# Patient Record
Sex: FEMALE | Race: WHITE | Employment: FULL TIME
[De-identification: names, ages, dates, MRNs, and addresses within clinical notes are randomized per-mention and may not be internally consistent; named-entity substitution may affect disease eponyms.]

---

## 2017-07-08 ENCOUNTER — HEALTH MAINTENANCE LETTER (OUTPATIENT)
Age: 67
End: 2017-07-08

## 2018-01-18 ENCOUNTER — TELEPHONE (OUTPATIENT)
Dept: FAMILY MEDICINE | Facility: CLINIC | Age: 68
End: 2018-01-18

## 2018-01-18 DIAGNOSIS — Z20.828 EXPOSURE TO INFLUENZA: Primary | ICD-10-CM

## 2018-01-18 RX ORDER — OSELTAMIVIR PHOSPHATE 75 MG/1
75 CAPSULE ORAL 2 TIMES DAILY
Qty: 10 CAPSULE | Refills: 0 | Status: SHIPPED | OUTPATIENT
Start: 2018-01-18 | End: 2018-09-04

## 2018-01-18 NOTE — TELEPHONE ENCOUNTER
Reason for Call:  Other call back    Detailed comments: Pt is in [place] and son has been diagnosed with flu. She is requesting Tamiflu be called in to [facility] in [place]. She will get the pharmacy information to give to nurse when she calls. Pt does have symptoms for flu also.    Phone Number Patient can be reached at: Cell number on file:    Telephone Information:   Mobile [phone]       Best Time: any        Call taken on 1/18/2018 at 10:12 AM by Falguni Carranza

## 2018-01-18 NOTE — TELEPHONE ENCOUNTER
"Sent in to the pharmacy (I called them and got the correct number, ) and notified Nidia that this has been done. Advised if worse to be seen. \"ok, thank you\"    Simran MARTINEZ    "

## 2018-01-18 NOTE — TELEPHONE ENCOUNTER
"Influenza-Like Illness (AURELIO) Protocol    Nancy K Eisenmenger      Age: 67 year old     YOB: 1950    Are you currently sick or have you had close contact with someone who is currently sick?   Yes, this patient is currently sick.     Adult Clinical Evaluation    Is this patient experiencing ANY of the following?  Unconsciousness or unresponsiveness No   Difficulty breathing or swallowing No   Blue or dusky lips, skin, or nail beds No   Chest pain No   Severe confusion or delirium No   Seizure activity: ongoing or stopped No   Severe dehydration or signs of shock No   Patient sounds very sick on the phone No     Is this patient experiencing ANY of the following?  Fever > 104 or shaking chills Out of town, no thermometer. No shaking chills, \"just hot and cold at times\"    Wheezing with minimal response to usual wheezing medications or new wheezing No   Repeated vomiting or diarrhea with signs of dehydration (no urination within last 12 hours) Vomited once last night, urinated this morning   Flu-like symptoms that initially improved but returned with fever and a worse cough No   Stiff or painful neck Sore neck, not stiff, and able to touch her chin to her chest.    Severe headache Not worst headache of her life. /\"just a headache\"      Does the patient have any of the following?  Measured fever > 100 degrees As above, out of town and no thermometer   Chills or feels very warm to the touch Feel hot and cold at times    Cough Yes, non productive    Sore throat \"a touch of sore throat\"    Muscle/ body aches Yes   Headaches Yes   Fatigue (tiredness) Yes     Nursing Plan      Below are conditions which place adults at increased risk for the more severe complications of influenza.    Does this patient have ANY of the following conditions?  Chronic pulmonary disease such as asthma or COPD No   Heart disease (CHF, CAD, anticoag due to arrhythmia) No   Liver disease (hepatitis, liver failure, cirrhosis) No "   Kidney disease (renal failure, insufficiency or dialysis) No   Metabolic disorder (e.g. diabetes) No   Neuromuscular disorder (EFRAÍN KIRKPATRICK MD, myasthenia gravis) No   Compromised ability to handle respiratory secretions No   Hematologic disorder (e.g. sickle cell disease) No   HIV / AIDS No   Chemotherapy or radiation within the last 3 months No   Received an organ or a bone marrow transplant No   Taking Prednisone in excess of 20mg daily No   Is age 65 years or older Yes   Is pregnant, thinks she may be pregnant or is within two weeks after delivery No   Is a resident of a chronic care facility No   Is patient  or [place] native  No     Patient falls into high risk category.   AURELIO symptom onset less than 48 hours.    No Known Allergies    Does this patient have an allergy or hypersensitivity to Oseltamivir (Tamiflu)?  No.      Patient Active Problem List   Diagnosis     CARDIOVASCULAR SCREENING; LDL GOAL LESS THAN 160     Kyphosis     Osteoporosis     Heart murmur       Last recorded GFR on this patient:   No results found for: GFRESTIMATED  No results found for: GFRESTBLACK    Sex:  female     Wt Readings from Last 1 Encounters:   05/31/13 148 lb (67.1 kg)       Age:  67 year old     No results found for: CR  Creatinine Clearance Calculator    Does this patient currently have kidney disease? (defined as Chronic Kidney Disease Stage 3,4 or 5 on the problem list or a GFR less than 60 ml/min if known)  No. No creatinine on file.  Huddled with provider       If further questions/concerns or if new symptoms develop, call your PCP or [facility] as soon as possible.      Provided home care instructions    General home care instruction:      Avoid contact with people in your household who are at increased risk for more severe complications of influenza (such as pregnant women or people who have a chronic health condition, for example diabetes, heart disease, asthma, or emphysema).    Stay home  from work, school, childcare or other public places until your fever (37.8 degrees Celsius [100 degrees Fahrenheit]) has been gone for at least 24 hours, except to seek medical care. (Fever should be gone without the use of fever-reducing medications.) Use a surgical mask if available, or cover your mouth and nose with a tissue if possible if you need to seek medical care. Contact your work place, school, or  as they may have longer exclusion times.    You may continue to shed virus after your fever is gone. Limit your contact with high-risk individuals for 10 days after your symptoms started and be especially careful to cover your coughs/sneezes and wash your hands.    Cover your cough and wash your hands often, and especially after coughing, sneezing, blowing your nose.    Drink plenty of fluids (such as water, broth, sports drinks, electrolyte beverages for children) to prevent dehydration.    Avoid tobacco and second hand smoke.    Get plenty of rest.    Use over-the-counter pain relievers as needed per  instructions.    Do not give aspirin (acetylsalicylic acid) or products that contain aspirin (e.g. bismuth subsalicylate - Pepto Bismol) to children or teenagers 18 years or younger.    A small number of people with influenza do not have fever. If you have respiratory symptoms and are at increased risk for complications of influenza, contact your health care provider to discuss these symptoms.    For parents of infants:    If possible, only family members who are not sick should care for infants.    Wash your hands with soap and water, or an alcohol-based hand rub (if your hands are not visibly soiled) before caring for your infant.    Cover your mouth and nose with a tissue when coughing or sneezing, and clean your hands.    Contact a health care provider to discuss your illness within 1-2 days if you are    Pregnant    Immunocompromised    Call 911 if you experience:    Difficulty breathing  or shortness of breath    Pain or pressure in the chest    Confusion or less responsive than normal    Seizure activity: ongoing or stopped    Severe dehydration or signs of shock     Blue or dusky lips, skin, or nail beds    If further questions/concerns or if new symptoms develop, call your PCP or [facility] as soon as possible.    When to seek medical attention    Contact your health care provider right away if you experience:    A painful sore throat accompanied by fever persists for more than 48 hours    Ear pain, sinus pain, persistent vomiting and/or diarrhea    Oral temperature greater than 104  Fahrenheit (40  Celsius)    Dehydration (e.g., mouth feeling dry, dizzy when sitting/standing, decreased urine output)    Severe or persistent vomiting; unable to keep fluids down    Improvement in flu-like symptoms (fever and cough or sore throat) but then return of fever and worse cough or sore throat    Not drinking enough fluid  Any other concerns not stated above      Additional educational resources include:    http://www.mInfo.com    http://www.cdc.gov/flu/      [place]   [address] [facility]   Phone [phone]       Son was in to an ER there and confirmed influenza

## 2018-01-18 NOTE — TELEPHONE ENCOUNTER
Dr Diaz,    We have no creatinine on file, so need you to ok tamiflu if indicated. Please see notes below. (no known kidney disease or problems per her report. )       She is in [place] at father in law's , and her son has influenza, confirmed.   She is 67 (the only high risk positive question)         Simran Kan RNC

## 2018-09-04 ENCOUNTER — HOSPITAL ENCOUNTER (EMERGENCY)
Facility: CLINIC | Age: 68
Discharge: HOME | End: 2018-09-04
Attending: PHYSICIAN ASSISTANT | Admitting: PHYSICIAN ASSISTANT
Payer: MEDICARE

## 2018-09-04 VITALS
TEMPERATURE: 98.2 F | WEIGHT: 145 LBS | OXYGEN SATURATION: 98 % | BODY MASS INDEX: 22.76 KG/M2 | HEIGHT: 67 IN | DIASTOLIC BLOOD PRESSURE: 70 MMHG | RESPIRATION RATE: 16 BRPM | SYSTOLIC BLOOD PRESSURE: 127 MMHG

## 2018-09-04 DIAGNOSIS — J06.9 VIRAL URI WITH COUGH: ICD-10-CM

## 2018-09-04 DIAGNOSIS — H72.91 EAR DRUM PERFORATION, RIGHT: ICD-10-CM

## 2018-09-04 PROCEDURE — G0463 HOSPITAL OUTPT CLINIC VISIT: HCPCS | Performed by: PHYSICIAN ASSISTANT

## 2018-09-04 PROCEDURE — G0463 HOSPITAL OUTPT CLINIC VISIT: HCPCS

## 2018-09-04 PROCEDURE — 99213 OFFICE O/P EST LOW 20 MIN: CPT | Mod: Z6 | Performed by: PHYSICIAN ASSISTANT

## 2018-09-04 RX ORDER — AMOXICILLIN 875 MG
875 TABLET ORAL 2 TIMES DAILY
Qty: 20 TABLET | Refills: 0 | Status: SHIPPED | OUTPATIENT
Start: 2018-09-04 | End: 2019-03-11

## 2018-09-04 RX ORDER — FLUTICASONE PROPIONATE 50 MCG
1-2 SPRAY, SUSPENSION (ML) NASAL DAILY
Qty: 1 BOTTLE | Refills: 0 | Status: SHIPPED | OUTPATIENT
Start: 2018-09-04 | End: 2019-03-11

## 2018-09-04 RX ORDER — BENZONATATE 100 MG/1
100-200 CAPSULE ORAL 3 TIMES DAILY PRN
Qty: 42 CAPSULE | Refills: 0 | Status: SHIPPED | OUTPATIENT
Start: 2018-09-04 | End: 2019-03-11

## 2018-09-04 NOTE — ED PROVIDER NOTES
History     Chief Complaint   Patient presents with     URI     cough, sore throat, chills     HPI  Nancy K Eisenmenger is a 67 year old female who is in urgent care with concern over 1 week history of cough.  She initially complains of chills, myalgias, sore throats, hoarse voice, right ear discharge.  She has not had any objective fever, dyspnea, wheezing, nausea, vomiting, diarrhea or abdominal pain.  Her symptoms developed shortly after she was at her grandson who had similar complaints.  She denies any history of asthma, COPD or other breathing related disorder.  She is a non-smoker.     Problem List:    Patient Active Problem List    Diagnosis Date Noted     Heart murmur 04/12/2013     Priority: Medium     Probable mitral valve prolapse; followed by Cardiology, Dr. Araujo.        Osteoporosis 12/21/2011     Priority: Medium     Osteopenia since 2006; osteoporosis in 2011.        Kyphosis 11/16/2011     Priority: Medium     CARDIOVASCULAR SCREENING; LDL GOAL LESS THAN 160 10/31/2010     Priority: Medium        Past Medical History:    Past Medical History:   Diagnosis Date     Other abnormal heart sounds      Ventricular septal defect 1996       Past Surgical History:    Past Surgical History:   Procedure Laterality Date     SURGICAL HISTORY OF -   1/3/05    RIGHT middle ear reconstruction, LEFT tympanostomy w/tube     SURGICAL HISTORY OF -   12/29/04    RIGHT tympanoplasty, LEFT myringotomy & PE tube        Family History:    Family History   Problem Relation Age of Onset     GASTROINTESTINAL DISEASE Father      Ulcers     HEART DISEASE Father        Social History:  Marital Status:   [2]  Social History   Substance Use Topics     Smoking status: Never Smoker     Smokeless tobacco: Never Used     Alcohol use Yes      Comment: Occ. less than 7/week        Medications:      amoxicillin (AMOXIL) 875 MG tablet   benzonatate (TESSALON) 100 MG capsule   fluticasone (FLONASE) 50 MCG/ACT spray   CALCIUM + D  "OR   Multiple Vitamin (MULTIVITAMIN) per tablet     Review of Systems  CONSTITUTIONAL:POSITIVE for chills, myalgias NEGATIVE for fever  INTEGUMENTARY/SKIN: NEGATIVE for worrisome rashes, moles or lesions  EYES: NEGATIVE for vision changes or irritation  ENT/MOUTH: POSITIVE for sore throat, right ear discharge, hoarse voice, nasal congestion   RESP:POSITIVE for cough and NEGATIVE for SOB/dyspnea and wheezing  GI: NEGATIVE for abdominal pain, diarrhea, nausea and vomiting  Physical Exam   BP: 127/70  Heart Rate: 76  Temp: 98.2  F (36.8  C)  Resp: 16  Height: 170.2 cm (5' 7\")  Weight: 65.8 kg (145 lb)  SpO2: 98 %    Physical Exam  GENERAL APPEARANCE: healthy, alert and no distress  EYES: EOMI,  PERRL, conjunctiva clear  HENT: the right ear canal has some purulent discharge preset, right TM is injected with central perforation, left TM has fluid posteriorly, left canal is non-erythematous, without tenderness or discharge.  There is swelling of the mucosa of the nares left greater than right.   Posterior pharynx non-erythematous without exudate   NECK: supple, nontender, no lymphadenopathy  RESP: lungs clear to auscultation - no rales, rhonchi or wheezes  CV: regular rates and rhythm, normal S1 S2, no murmur noted  SKIN: no suspicious lesions or rashes  ED Course     ED Course     Procedures        Critical Care time:  none            No results found for this or any previous visit (from the past 24 hour(s)).  Medications - No data to display    Assessments & Plan (with Medical Decision Making)     I have reviewed the nursing notes.    I have reviewed the findings, diagnosis, plan and need for follow up with the patient.       Discharge Medication List as of 9/4/2018 12:58 PM      START taking these medications    Details   amoxicillin (AMOXIL) 875 MG tablet Take 1 tablet (875 mg) by mouth 2 times daily for 10 days, Disp-20 tablet, R-0, E-Prescribe      benzonatate (TESSALON) 100 MG capsule Take 1-2 capsules (100-200 mg) " by mouth 3 times daily as needed, Disp-42 capsule, R-0, E-Prescribe      fluticasone (FLONASE) 50 MCG/ACT spray Spray 1-2 sprays into both nostrils daily, Disp-1 Bottle, R-0, E-Prescribe           Final diagnoses:   Viral URI with cough   Ear drum perforation, right     67-year-old female presents to urgent care with concern over 1 week history of cough with newer onset right ear discomfort, discharge.  She had stable vital signs upon arrival.  Physical exam findings as described above were significant for a perforation of her right eardrum with evidence of infection.  There is no evidence of otitis externa, mastoiditis.  Her cough appears most consistent with viral URI.  I do not suspect bacterial sinusitis, pneumonia, bronchitis, pertussis and will defer further evaluation.  She was discharged home stable with prescription for amoxicillin for her ear infection along with Tessalon to use as needed for symptomatic relief.  Follow-up with primary care provider if no improvement within the next 3-5 days.  Worrisome reasons to return to the ER/UC sooner discussed.    Disclaimer: This note consists of symbols derived from keyboarding, dictation, and/or voice recognition software. As a result, there may be errors in the script that have gone undetected.  Please consider this when interpreting information found in the chart.      9/4/2018   [facility] EMERGENCY DEPARTMENT     Ирина Kurtz PA-C  09/09/18 3877

## 2018-09-04 NOTE — ED AVS SNAPSHOT
[facility] Emergency Department    [address] [address]    Phone:  [phone]    Fax:  [phone]                                       Nancy K Eisenmenger   MRN: [id number]    Department:  [facility] Emergency Department   Date of Visit:  9/4/2018           After Visit Summary Signature Page     I have received my discharge instructions, and my questions have been answered. I have discussed any challenges I see with this plan with the nurse or doctor.    ..........................................................................................................................................  Patient/Patient Representative Signature      ..........................................................................................................................................  Patient Representative Print Name and Relationship to Patient    ..................................................               ................................................  Date                                            Time    ..........................................................................................................................................  Reviewed by Signature/Title    ...................................................              ..............................................  Date                                                            Time          22EPIC Rev 08/18

## 2018-09-04 NOTE — ED AVS SNAPSHOT
[facility] Emergency Department    [address] [address]    Phone:  [phone]    Fax:  [phone]                                       Nancy K Eisenmenger   MRN: [id number]    Department:  [facility] Emergency Department   Date of Visit:  9/4/2018           Patient Information     Date Of Birth          1950        Your diagnoses for this visit were:     Viral URI with cough     Ear drum perforation, right        You were seen by Ирина Kurtz PA-C.      Follow-up Information     Follow up with Vasu Diaz MD In 1 week.    Specialty:  Family Practice    Why:  As needed, If symptoms worsen    Contact information:    [address] [address]  [phone]        24 Hour Appointment Hotline       To make an appointment at any [facility] clinic, call [phone] ([phone]). If you don't have a family doctor or clinic, we will help you find one. [facility] clinics are conveniently located to serve the needs of you and your family.             Review of your medicines      START taking        Dose / Directions Last dose taken    amoxicillin 875 MG tablet   Commonly known as:  AMOXIL   Dose:  875 mg   Quantity:  20 tablet        Take 1 tablet (875 mg) by mouth 2 times daily for 10 days   Refills:  0        benzonatate 100 MG capsule   Commonly known as:  TESSALON   Dose:  100-200 mg   Quantity:  42 capsule        Take 1-2 capsules (100-200 mg) by mouth 3 times daily as needed   Refills:  0        fluticasone 50 MCG/ACT spray   Commonly known as:  FLONASE   Dose:  1-2 spray   Quantity:  1 Bottle        Spray 1-2 sprays into both nostrils daily   Refills:  0          Our records show that you are taking the medicines listed below. If these are incorrect, please call your family doctor or clinic.        Dose / Directions Last dose taken    CALCIUM + D PO        1 TABLET DAILY   Refills:  0        multivitamin per tablet   Dose:  1 tablet   Quantity:  100  tablet        Take 1 tablet by mouth daily.   Refills:  12                Prescriptions were sent or printed at these locations (3 Prescriptions)                   [facility] [place] [address] [address]   [address] [place], [place] [address]    Telephone:  [phone]   Fax:  [phone]   Hours:                  E-Prescribed (3 of 3)         amoxicillin (AMOXIL) 875 MG tablet               benzonatate (TESSALON) 100 MG capsule               fluticasone (FLONASE) 50 MCG/ACT spray                Orders Needing Specimen Collection     None      Pending Results     No orders found from 9/2/2018 to 9/5/2018.            Pending Culture Results     No orders found from 9/2/2018 to 9/5/2018.            Pending Results Instructions     If you had any lab results that were not finalized at the time of your Discharge, you can call the ED Lab Result RN at [phone]. You will be contacted by this team for any positive Lab results or changes in treatment. The nurses are available 7 days a week from 10A to 6:30P.  You can leave a message 24 hours per day and they will return your call.        Test Results From Your Hospital Stay               Thank you for choosing [facility]       Thank you for choosing [facility] for your care. Our goal is always to provide you with excellent care. Hearing back from our patients is one way we can continue to improve our services. Please take a few minutes to complete the written survey that you may receive in the mail after you visit with us. Thank you!        [employer] Information     [employer] gives you secure access to your electronic health record. If you see a primary care provider, you can also send messages to your care team and make appointments. If you have questions, please call your primary care clinic.  If you do not have a primary care provider, please call [phone] and they will assist you.        Care EveryWhere ID     This is your Care EveryWhere ID. This  could be used by other organizations to access your [place] medical records  [id number]        Equal Access to Services     SAM GONZALEZ : Hadii jose Hurst, shailesh warren, aparna glez. So [facility] [phone].    ATENCIÓN: Si habla español, tiene a lowery disposición servicios gratuitos de asistencia lingüística. Llame al [phone].    We comply with applicable federal civil rights laws and [place] laws. We do not discriminate on the basis of race, color, national origin, age, disability, sex, sexual orientation, or gender identity.            After Visit Summary       This is your record. Keep this with you and show to your community pharmacist(s) and doctor(s) at your next visit.

## 2018-09-26 ENCOUNTER — APPOINTMENT (OUTPATIENT)
Dept: GENERAL RADIOLOGY | Facility: CLINIC | Age: 68
End: 2018-09-26
Attending: PHYSICIAN ASSISTANT
Payer: MEDICARE

## 2018-09-26 ENCOUNTER — HOSPITAL ENCOUNTER (EMERGENCY)
Facility: CLINIC | Age: 68
Discharge: HOME | End: 2018-09-26
Attending: PHYSICIAN ASSISTANT | Admitting: PHYSICIAN ASSISTANT
Payer: MEDICARE

## 2018-09-26 VITALS
TEMPERATURE: 98.1 F | DIASTOLIC BLOOD PRESSURE: 72 MMHG | HEART RATE: 90 BPM | HEIGHT: 68 IN | OXYGEN SATURATION: 95 % | SYSTOLIC BLOOD PRESSURE: 148 MMHG | RESPIRATION RATE: 14 BRPM | BODY MASS INDEX: 22.05 KG/M2

## 2018-09-26 DIAGNOSIS — J01.90 ACUTE SINUSITIS WITH SYMPTOMS > 10 DAYS: ICD-10-CM

## 2018-09-26 DIAGNOSIS — J20.9 ACUTE BRONCHITIS WITH SYMPTOMS > 10 DAYS: ICD-10-CM

## 2018-09-26 LAB
INTERNAL QC OK POCT: YES
S PYO AG THROAT QL IA.RAPID: NEGATIVE

## 2018-09-26 PROCEDURE — 87880 STREP A ASSAY W/OPTIC: CPT | Performed by: PHYSICIAN ASSISTANT

## 2018-09-26 PROCEDURE — 71046 X-RAY EXAM CHEST 2 VIEWS: CPT

## 2018-09-26 PROCEDURE — 99213 OFFICE O/P EST LOW 20 MIN: CPT | Performed by: PHYSICIAN ASSISTANT

## 2018-09-26 PROCEDURE — G0463 HOSPITAL OUTPT CLINIC VISIT: HCPCS | Mod: 25

## 2018-09-26 PROCEDURE — 87081 CULTURE SCREEN ONLY: CPT | Performed by: PHYSICIAN ASSISTANT

## 2018-09-26 ASSESSMENT — ENCOUNTER SYMPTOMS
NEUROLOGICAL NEGATIVE: 1
GASTROINTESTINAL NEGATIVE: 1
CONSTITUTIONAL NEGATIVE: 1
MUSCULOSKELETAL NEGATIVE: 1
SHORTNESS OF BREATH: 0
SORE THROAT: 1
CHILLS: 0
FEVER: 0
SINUS PAIN: 0
COUGH: 1

## 2018-09-26 NOTE — ED PROVIDER NOTES
History     Chief Complaint   Patient presents with     Cough     has had a cough for the last 3-4 weeks     HPI  Nancy K Eisenmenger is a 67 year old female who presents with complaints of productive cough over the past 4 weeks.  Patient also complains of associated nasal and sinus congestion.  Patient reports associated sore throat now.  She has since developed some chest discomfort that she reports is only with coughing.  Denies fevers, chills, facial pain, headaches, rash, neck pain/stiffness, or shortness of breath.  Patient was evaluated in the urgent care for the same cough 3 weeks ago and was diagnosed with a ruptured eardrum at that time.  She finished a course of Amoxicillin and Tessalon without improvement in her symptoms.  She states her symptoms seem to be waxing and waning over the past 4 weeks.  Patient denies history of asthma or underlying lung disease.  She is a non-smoker.      Problem List:    Patient Active Problem List    Diagnosis Date Noted     Heart murmur 04/12/2013     Priority: Medium     Probable mitral valve prolapse; followed by Cardiology, Dr. Araujo.        Osteoporosis 12/21/2011     Priority: Medium     Osteopenia since 2006; osteoporosis in 2011.        Kyphosis 11/16/2011     Priority: Medium     CARDIOVASCULAR SCREENING; LDL GOAL LESS THAN 160 10/31/2010     Priority: Medium        Past Medical History:    Past Medical History:   Diagnosis Date     Other abnormal heart sounds      Ventricular septal defect 1996       Past Surgical History:    Past Surgical History:   Procedure Laterality Date     SURGICAL HISTORY OF -   1/3/05    RIGHT middle ear reconstruction, LEFT tympanostomy w/tube     SURGICAL HISTORY OF -   12/29/04    RIGHT tympanoplasty, LEFT myringotomy & PE tube        Family History:    Family History   Problem Relation Age of Onset     GASTROINTESTINAL DISEASE Father      Ulcers     HEART DISEASE Father        Social History:  Marital Status:   [2]  Social  "History   Substance Use Topics     Smoking status: Never Smoker     Smokeless tobacco: Never Used     Alcohol use Yes      Comment: Occ. less than 7/week        Medications:      amoxicillin-clavulanate (AUGMENTIN) 875-125 MG per tablet   benzonatate (TESSALON) 100 MG capsule   CALCIUM + D OR   fluticasone (FLONASE) 50 MCG/ACT spray   Multiple Vitamin (MULTIVITAMIN) per tablet         Review of Systems   Constitutional: Negative.  Negative for chills and fever.   HENT: Positive for congestion and sore throat. Negative for sinus pain.    Respiratory: Positive for cough. Negative for shortness of breath.    Cardiovascular: Positive for chest pain (with coughing only).   Gastrointestinal: Negative.    Musculoskeletal: Negative.    Skin: Negative.    Neurological: Negative.    All other systems reviewed and are negative.      Physical Exam   BP: 148/72  Pulse: 90  Temp: 98.1  F (36.7  C)  Resp: 14  Height: 172.7 cm (5' 8\")  SpO2: 95 %      Physical Exam   Constitutional: She is oriented to person, place, and time. She appears well-developed and well-nourished.  Non-toxic appearance. No distress.   HENT:   Head: Normocephalic and atraumatic.   Right Ear: Tympanic membrane, external ear and ear canal normal.   Left Ear: Tympanic membrane, external ear and ear canal normal.   Nose: Mucosal edema and rhinorrhea present.   Mouth/Throat: Uvula is midline and mucous membranes are normal. No uvula swelling. Posterior oropharyngeal erythema present. No oropharyngeal exudate, posterior oropharyngeal edema or tonsillar abscesses.   Eyes: Conjunctivae and EOM are normal. Pupils are equal, round, and reactive to light.   Neck: Normal range of motion and full passive range of motion without pain. Neck supple. No rigidity. Normal range of motion present.   Cardiovascular: Normal rate, regular rhythm and normal heart sounds.    Pulmonary/Chest: Effort normal and breath sounds normal. No respiratory distress. She has no decreased breath " sounds. She has no wheezes. She has no rhonchi. She has no rales.   Lymphadenopathy:     She has no cervical adenopathy.   Neurological: She is alert and oriented to person, place, and time.   Skin: Skin is warm and dry. No rash noted.       ED Course     ED Course     Procedures    Results for orders placed or performed during the hospital encounter of 09/26/18 (from the past 24 hour(s))   Rapid strep group A screen POCT   Result Value Ref Range    Rapid Strep A Screen negative neg    Internal QC OK Yes    XR Chest 2 Views    Narrative    XR CHEST 2 VW 9/26/2018 5:01 PM     HISTORY: cough;       Impression    IMPRESSION: Negative exam. The lungs appear clear. No pleural  effusion. Mid thoracic degenerative disc disease is noted.    TAMMIE ELIZABETH MD       Medications - No data to display    Assessments & Plan (with Medical Decision Making)     Pt is a 67 year old female who presents with complaints of productive cough over the past 4 weeks.  Patient also complains of associated nasal and sinus congestion.  Patient reports associated sore throat now.  She has since developed some chest discomfort that she reports is only with coughing.  Patient was evaluated in the urgent care for the same cough 3 weeks ago and was diagnosed with a ruptured eardrum at that time.  She finished a course of Amoxicillin and Tessalon without improvement in her symptoms.  She states her symptoms seem to be waxing and waning over the past 4 weeks.  Patient denies history of asthma or underlying lung disease.  She is a non-smoker.  Pt is afebrile on arrival.  Exam as above.  Rapid strep was negative.  Culture is pending.  Chest x-ray was negative for pneumonia or acute pathology.  Discussed results with patient.  Return precautions were reviewed.  Hand-outs were provided.    Patient was sent with Augmentin and was instructed to follow-up with PCP if no improvement in 5-7 days for continued care and management or sooner if new or worsening  symptoms.  She is to return to the ED for persistent and/or worsening symptoms.  Patient expressed understanding of the diagnosis and plan and was discharged home in good condition.    I have reviewed the nursing notes.    I have reviewed the findings, diagnosis, plan and need for follow up with the patient.    Discharge Medication List as of 9/26/2018  5:30 PM      START taking these medications    Details   amoxicillin-clavulanate (AUGMENTIN) 875-125 MG per tablet Take 1 tablet by mouth 2 times daily for 10 days, Disp-20 tablet, R-0, E-Prescribe             Final diagnoses:   Acute sinusitis with symptoms > 10 days   Acute bronchitis with symptoms > 10 days       9/26/2018   [facility] EMERGENCY DEPARTMENT     Kristina Wayne PA-C  09/26/18 [id number]

## 2018-09-26 NOTE — DISCHARGE INSTRUCTIONS
Acute Bacterial Rhinosinusitis (ABRS)  Acute bacterial rhinosinusitis (ABRS) is an infection of your nasal cavity and sinuses. It s caused by bacteria. Acute means that you ve had symptoms for less than 4 weeks, but possibly up to 12 weeks.  Understanding your sinuses  The nasal cavity is the large air-filled space behind your nose. The sinuses are a group of spaces formed by the bones of your face. They connect with your nasal cavity. ABRS causes the tissue lining these spaces to become inflamed. Mucus may not drain normally. This leads to facial pain and other symptoms.  What causes ABRS?  ABRS most often follows an upper respiratory infection caused by a virus. Bacteria then infect the lining of your nasal cavity and sinuses. But you can also get ABRS if you have:    Nasal allergies    Long-term nasal swelling and congestion not caused by allergies    Blockage in the nose  Symptoms of ABRS  The symptoms of ABRS may be different for each person and include:    Nasal congestion or blockage    Pain or pressure in the face    Thick, colored drainage from the nose  Other symptoms may include:    Runny nose    Fluid draining from the nose down the throat (postnasal drip)    Headache    Cough    Pain    Fever  Diagnosing ABRS  ABRS may be diagnosed if you ve had an upper respiratory infection like a cold and cough for 10 or more days without improvement or with worsening symptoms. Your healthcare provider will ask about your symptoms and your medical history. The provider will check your vital signs, including your temperature. You ll have a physical exam. The healthcare provider will check your ears, nose, and throat. You likely won t need any tests. If ABRS comes back, you may have a culture or other tests.  Treatment for ABRS  Treatment may include:    Antibiotic medicine. This is for symptoms that last for at least 10 to 14 days.    Nasal corticosteroid medicine. Drops or spray used in the nose can lessen swelling  and congestion.    Over-the-counter pain medicine. This is to lessen sinus pain and pressure.    Nasal decongestant medicine. Spray or drops may help to lessen congestion. Do not use them for more than a few days.    Salt wash (saline irrigation). This can help to loosen mucus.  When to call the healthcare provider  Call your healthcare provider if you have any of the following:    Symptoms that don t get better, or get worse    Symptoms that don t get better after 3 to 5 days on antibiotics    Trouble seeing    Swelling around your eyes    Confusion or trouble staying awake   Date Last Reviewed: 5/1/2017 2000-2017 [employer]. [address] [place], [place] [id number]. All rights reserved. This information is not intended as a substitute for professional medical care. Always follow your healthcare professional's instructions.          Acute Bronchitis  Your healthcare provider has told you that you have acute bronchitis. Bronchitis is infection or inflammation of the bronchial tubes (airways in the lungs). Normally, air moves easily in and out of the airways. Bronchitis narrows the airways, making it harder for air to flow in and out of the lungs. This causes symptoms such as shortness of breath, coughing up yellow or green mucus, and wheezing. Bronchitis can be acute or chronic. Acute means the condition comes on quickly and goes away in a short time, usually within 3 to 10 days. Chronic means a condition lasts a long time and often comes back.    What causes acute bronchitis?  Acute bronchitis almost always starts as a viral respiratory infection, such as a cold or the flu. Certain factors make it more likely for a cold or flu to turn into bronchitis. These include being very young, being elderly, having a heart or lung problem, or having a weak immune system. Cigarette smoking also makes bronchitis more likely.  When bronchitis develops, the airways become swollen. The airways may also become  infected with bacteria. This is known as a secondary infection.  Diagnosing acute bronchitis  Your healthcare provider will examine you and ask about your symptoms and health history. You may also have a sputum culture to test the fluid in your lungs. Chest X-rays may be done to look for infection in the lungs.  Treating acute bronchitis  Bronchitis usually clears up as the cold or flu goes away. You can help feel better faster by doing the following:    Take medicine as directed. You may be told to take ibuprofen or other over-the-counter medicines. These help relieve inflammation in your bronchial tubes. Your healthcare provider may prescribe an inhaler to help open up the bronchial tubes. Most of the time, acute bronchitis is caused by a viral infection. Antibiotics are usually not prescribed for viral infections.    Drink plenty of fluids, such as water, juice, or warm soup. Fluids loosen mucus so that you can cough it up. This helps you breathe more easily. Fluids also prevent dehydration.    Make sure you get plenty of rest.    Do not smoke. Do not allow anyone else to smoke in your home.  Recovery and follow-up  Follow up with your doctor as you are told. You will likely feel better in a week or two. But a dry cough can linger beyond that time. Let your doctor know if you still have symptoms (other than a dry cough) after 2 weeks, or if you re prone to getting bronchial infections. Take steps to protect yourself from future infections. These steps include stopping smoking and avoiding tobacco smoke, washing your hands often, and getting a yearly flu shot.  When to call your healthcare provider  Call the healthcare provider if you have any of the following:    Fever of 100.4 F (38.0 C) or higher, or as advised    Symptoms that get worse, or new symptoms    Trouble breathing    Symptoms that don t start to improve within a week, or within 3 days of taking antibiotics   Date Last Reviewed: 12/1/2016 2000-2017  [email or web address]. [address] [place] [address], [place] [address]. All rights reserved. This information is not intended as a substitute for professional medical care. Always follow your healthcare professional's instructions.

## 2018-09-26 NOTE — ED AVS SNAPSHOT
[facility] Emergency Department    [address] [address]    Phone:  [phone]    Fax:  [phone]                                       Nancy K Eisenmenger   MRN: [id number]    Department:  [facility] Emergency Department   Date of Visit:  9/26/2018           Patient Information     Date Of Birth          1950        Your diagnoses for this visit were:     Acute sinusitis with symptoms > 10 days     Acute bronchitis with symptoms > 10 days        You were seen by Kristina Wayne PA-C.      Follow-up Information     Follow up with Joseluis Armstrong MD. Call in 1 week.    Specialty:  Family Practice    Why:  For follow-up    Contact information:    [facility]  [address]  [facility] [address]  [phone]          Follow up with [facility] Emergency Department.    Specialty:  EMERGENCY MEDICINE    Why:  As needed, If symptoms worsen    Contact information:    [address] [place] [address] [phone]    Additional information:    The medical center is located at   [address] [place] [address]. (between [address] and   [facility] [address] in [place], [place] miles north   of [place]).        Discharge Instructions         Acute Bacterial Rhinosinusitis (ABRS)  Acute bacterial rhinosinusitis (ABRS) is an infection of your nasal cavity and sinuses. It s caused by bacteria. Acute means that you ve had symptoms for less than 4 weeks, but possibly up to 12 weeks.  Understanding your sinuses  The nasal cavity is the large air-filled space behind your nose. The sinuses are a group of spaces formed by the bones of your face. They connect with your nasal cavity. ABRS causes the tissue lining these spaces to become inflamed. Mucus may not drain normally. This leads to facial pain and other symptoms.  What causes ABRS?  ABRS most often follows an upper respiratory infection caused by a virus. Bacteria then infect the lining of your nasal cavity and sinuses.  But you can also get ABRS if you have:    Nasal allergies    Long-term nasal swelling and congestion not caused by allergies    Blockage in the nose  Symptoms of ABRS  The symptoms of ABRS may be different for each person and include:    Nasal congestion or blockage    Pain or pressure in the face    Thick, colored drainage from the nose  Other symptoms may include:    Runny nose    Fluid draining from the nose down the throat (postnasal drip)    Headache    Cough    Pain    Fever  Diagnosing ABRS  ABRS may be diagnosed if you ve had an upper respiratory infection like a cold and cough for 10 or more days without improvement or with worsening symptoms. Your healthcare provider will ask about your symptoms and your medical history. The provider will check your vital signs, including your temperature. You ll have a physical exam. The healthcare provider will check your ears, nose, and throat. You likely won t need any tests. If ABRS comes back, you may have a culture or other tests.  Treatment for ABRS  Treatment may include:    Antibiotic medicine. This is for symptoms that last for at least 10 to 14 days.    Nasal corticosteroid medicine. Drops or spray used in the nose can lessen swelling and congestion.    Over-the-counter pain medicine. This is to lessen sinus pain and pressure.    Nasal decongestant medicine. Spray or drops may help to lessen congestion. Do not use them for more than a few days.    Salt wash (saline irrigation). This can help to loosen mucus.  When to call the healthcare provider  Call your healthcare provider if you have any of the following:    Symptoms that don t get better, or get worse    Symptoms that don t get better after 3 to 5 days on antibiotics    Trouble seeing    Swelling around your eyes    Confusion or trouble staying awake   Date Last Reviewed: 5/1/2017 2000-2017 [employer]. [address] [place] [address], [place] [address]. All rights reserved. This information is not  intended as a substitute for professional medical care. Always follow your healthcare professional's instructions.          Acute Bronchitis  Your healthcare provider has told you that you have acute bronchitis. Bronchitis is infection or inflammation of the bronchial tubes (airways in the lungs). Normally, air moves easily in and out of the airways. Bronchitis narrows the airways, making it harder for air to flow in and out of the lungs. This causes symptoms such as shortness of breath, coughing up yellow or green mucus, and wheezing. Bronchitis can be acute or chronic. Acute means the condition comes on quickly and goes away in a short time, usually within 3 to 10 days. Chronic means a condition lasts a long time and often comes back.    What causes acute bronchitis?  Acute bronchitis almost always starts as a viral respiratory infection, such as a cold or the flu. Certain factors make it more likely for a cold or flu to turn into bronchitis. These include being very young, being elderly, having a heart or lung problem, or having a weak immune system. Cigarette smoking also makes bronchitis more likely.  When bronchitis develops, the airways become swollen. The airways may also become infected with bacteria. This is known as a secondary infection.  Diagnosing acute bronchitis  Your healthcare provider will examine you and ask about your symptoms and health history. You may also have a sputum culture to test the fluid in your lungs. Chest X-rays may be done to look for infection in the lungs.  Treating acute bronchitis  Bronchitis usually clears up as the cold or flu goes away. You can help feel better faster by doing the following:    Take medicine as directed. You may be told to take ibuprofen or other over-the-counter medicines. These help relieve inflammation in your bronchial tubes. Your healthcare provider may prescribe an inhaler to help open up the bronchial tubes. Most of the time, acute bronchitis is  caused by a viral infection. Antibiotics are usually not prescribed for viral infections.    Drink plenty of fluids, such as water, juice, or warm soup. Fluids loosen mucus so that you can cough it up. This helps you breathe more easily. Fluids also prevent dehydration.    Make sure you get plenty of rest.    Do not smoke. Do not allow anyone else to smoke in your home.  Recovery and follow-up  Follow up with your doctor as you are told. You will likely feel better in a week or two. But a dry cough can linger beyond that time. Let your doctor know if you still have symptoms (other than a dry cough) after 2 weeks, or if you re prone to getting bronchial infections. Take steps to protect yourself from future infections. These steps include stopping smoking and avoiding tobacco smoke, washing your hands often, and getting a yearly flu shot.  When to call your healthcare provider  Call the healthcare provider if you have any of the following:    Fever of 100.4 F (38.0 C) or higher, or as advised    Symptoms that get worse, or new symptoms    Trouble breathing    Symptoms that don t start to improve within a week, or within 3 days of taking antibiotics   Date Last Reviewed: 12/1/2016 2000-2017 [employer]. [address] [place] [address], [place] [address]. All rights reserved. This information is not intended as a substitute for professional medical care. Always follow your healthcare professional's instructions.          24 Hour Appointment Hotline       To make an appointment at any [facility], call [phone] ([phone]). If you don't have a family doctor or clinic, we will help you find one. [facility] are conveniently located to serve the needs of you and your family.             Review of your medicines      START taking        Dose / Directions Last dose taken    amoxicillin-clavulanate 875-125 MG per tablet   Commonly known as:  AUGMENTIN   Dose:  1 tablet   Quantity:  20 tablet         Take 1 tablet by mouth 2 times daily for 10 days   Refills:  0          Our records show that you are taking the medicines listed below. If these are incorrect, please call your family doctor or clinic.        Dose / Directions Last dose taken    benzonatate 100 MG capsule   Commonly known as:  TESSALON   Dose:  100-200 mg   Quantity:  42 capsule        Take 1-2 capsules (100-200 mg) by mouth 3 times daily as needed   Refills:  0        CALCIUM + D PO        1 TABLET DAILY   Refills:  0        fluticasone 50 MCG/ACT spray   Commonly known as:  FLONASE   Dose:  1-2 spray   Quantity:  1 Bottle        Spray 1-2 sprays into both nostrils daily   Refills:  0        multivitamin per tablet   Dose:  1 tablet   Quantity:  100 tablet        Take 1 tablet by mouth daily.   Refills:  12                Prescriptions were sent or printed at these locations (1 Prescription)                   [facility] [place] - [place], [place] - [address] [facility]   [address] [facility] [address]    Telephone:  [phone]   Fax:  [phone]   Hours:                  E-Prescribed (1 of 1)         amoxicillin-clavulanate (AUGMENTIN) 875-125 MG per tablet                Procedures and tests performed during your visit     Beta strep group A r/o culture    Rapid strep group A screen POCT    XR Chest 2 Views      Orders Needing Specimen Collection     None      Pending Results     Date and Time Order Name Status Description    9/26/2018 1650 Beta strep group A r/o culture In process             Pending Culture Results     Date and Time Order Name Status Description    9/26/2018 1650 Beta strep group A r/o culture In process             Pending Results Instructions     If you had any lab results that were not finalized at the time of your Discharge, you can call the ED Lab Result RN at [phone]. You will be contacted by this team for any positive Lab results or changes in treatment. The nurses are available 7 days a week from 10A to  6:30P.  You can leave a message 24 hours per day and they will return your call.        Test Results From Your Hospital Stay        9/26/2018  4:50 PM      Component Results     Component Value Ref Range & Units Status    Rapid Strep A Screen negative neg Final    Internal QC OK Yes  Final         9/26/2018  5:05 PM      Narrative     XR CHEST 2 VW 9/26/2018 5:01 PM     HISTORY: cough;         Impression     IMPRESSION: Negative exam. The lungs appear clear. No pleural  effusion. Mid thoracic degenerative disc disease is noted.    TAMMIE ELIZABETH MD         9/26/2018  5:03 PM                Thank you for choosing [facility]       Thank you for choosing [facility] for your care. Our goal is always to provide you with excellent care. Hearing back from our patients is one way we can continue to improve our services. Please take a few minutes to complete the written survey that you may receive in the mail after you visit with us. Thank you!        MyChart Information     [employer] gives you secure access to your electronic health record. If you see a primary care provider, you can also send messages to your care team and make appointments. If you have questions, please call your primary care clinic.  If you do not have a primary care provider, please call [phone] and they will assist you.        Care EveryWhere ID     This is your Care EveryWhere ID. This could be used by other organizations to access your [facility] medical records  [id number]        Equal Access to Services     SAM GONZALEZ : Hadii jose butts Sonasir, waaxda luqadaha, qaybta kaalmada adeegyatate, aparna longoria. So [facility] [phone].    ATENCIÓN: Si habla español, tiene a lowery disposición servicios gratuitos de asistencia lingüística. Llame al [phone].    We comply with applicable federal civil rights laws and [place] laws. We do not discriminate on the basis of race, color, national origin, age, disability, sex,  sexual orientation, or gender identity.            After Visit Summary       This is your record. Keep this with you and show to your community pharmacist(s) and doctor(s) at your next visit.

## 2018-09-26 NOTE — ED AVS SNAPSHOT
[facility] Emergency Department    [address] [address]    Phone:  [phone]    Fax:  [phone]                                       Nancy K Eisenmenger   MRN: [id number]    Department:  [facility] Emergency Department   Date of Visit:  9/26/2018           After Visit Summary Signature Page     I have received my discharge instructions, and my questions have been answered. I have discussed any challenges I see with this plan with the nurse or doctor.    ..........................................................................................................................................  Patient/Patient Representative Signature      ..........................................................................................................................................  Patient Representative Print Name and Relationship to Patient    ..................................................               ................................................  Date                                   Time    ..........................................................................................................................................  Reviewed by Signature/Title    ...................................................              ..............................................  Date                                               Time          22EPIC Rev 08/18

## 2018-09-28 LAB
BACTERIA SPEC CULT: NORMAL
SPECIMEN SOURCE: NORMAL

## 2019-03-11 ENCOUNTER — HOSPITAL ENCOUNTER (EMERGENCY)
Facility: CLINIC | Age: 69
Discharge: HOME | End: 2019-03-11
Attending: NURSE PRACTITIONER | Admitting: NURSE PRACTITIONER
Payer: COMMERCIAL

## 2019-03-11 VITALS
WEIGHT: 140 LBS | BODY MASS INDEX: 21.97 KG/M2 | TEMPERATURE: 98.1 F | DIASTOLIC BLOOD PRESSURE: 82 MMHG | OXYGEN SATURATION: 98 % | HEIGHT: 67 IN | SYSTOLIC BLOOD PRESSURE: 149 MMHG

## 2019-03-11 DIAGNOSIS — J02.0 ACUTE STREPTOCOCCAL PHARYNGITIS: ICD-10-CM

## 2019-03-11 DIAGNOSIS — J32.9 SINUSITIS: ICD-10-CM

## 2019-03-11 LAB
INTERNAL QC OK POCT: YES
S PYO AG THROAT QL IA.RAPID: POSITIVE

## 2019-03-11 PROCEDURE — 87880 STREP A ASSAY W/OPTIC: CPT | Performed by: NURSE PRACTITIONER

## 2019-03-11 PROCEDURE — 99214 OFFICE O/P EST MOD 30 MIN: CPT | Mod: Z6 | Performed by: NURSE PRACTITIONER

## 2019-03-11 PROCEDURE — G0463 HOSPITAL OUTPT CLINIC VISIT: HCPCS | Performed by: NURSE PRACTITIONER

## 2019-03-11 RX ORDER — FLUTICASONE PROPIONATE 50 MCG
2 SPRAY, SUSPENSION (ML) NASAL DAILY
Qty: 15.8 ML | Refills: 0 | Status: SHIPPED | OUTPATIENT
Start: 2019-03-11 | End: 2019-04-10

## 2019-03-11 RX ORDER — AMOXICILLIN 500 MG/1
1000 CAPSULE ORAL 2 TIMES DAILY
Qty: 40 CAPSULE | Refills: 0 | Status: SHIPPED | OUTPATIENT
Start: 2019-03-11 | End: 2019-03-21

## 2019-03-11 ASSESSMENT — ENCOUNTER SYMPTOMS
APPETITE CHANGE: 1
HEADACHES: 0
SEIZURES: 0
DIZZINESS: 0
WHEEZING: 0
DIFFICULTY URINATING: 0
EYE REDNESS: 0
COUGH: 1
ACTIVITY CHANGE: 1
ARTHRALGIAS: 0
WOUND: 0
DYSURIA: 0
SORE THROAT: 1
COLOR CHANGE: 0
WEAKNESS: 0
NECK STIFFNESS: 0
STRIDOR: 0
SINUS PAIN: 0
SINUS PRESSURE: 1
ABDOMINAL PAIN: 0
CONFUSION: 0
FACIAL SWELLING: 0
FATIGUE: 1
FEVER: 0
SHORTNESS OF BREATH: 0

## 2019-03-11 ASSESSMENT — MIFFLIN-ST. JEOR: SCORE: 1207.67

## 2019-03-11 NOTE — ED PROVIDER NOTES
History     Chief Complaint   Patient presents with     Pharyngitis     started yesterday     Cough     for 3 weeks     HPI    SUBJECTIVE: Nancy K Eisenmenger  is here today because of:Sore Throat  The patient has had symptoms of cough, facial pressure, sore throat and decreased appetite, decreased activity, fatigue.   Onset of symptoms was 3 weeks ago for the cough and today a sore throat. Course of illness is persistent.  Patient admits to exposure to illness at home with grandson positive for RSV and ear infection, pink eye.   Patient denies fever, vomiting, diarrhea, chest congestion and wheezing  Treatment measures tried include advil and cough medication and cold ease and vitamin c.  Patient is not a smoker    Allergies:  No Known Allergies    Problem List:    Patient Active Problem List    Diagnosis Date Noted     Heart murmur 04/12/2013     Priority: Medium     Probable mitral valve prolapse; followed by Cardiology, Dr. Araujo.        Osteoporosis 12/21/2011     Priority: Medium     Osteopenia since 2006; osteoporosis in 2011.        Kyphosis 11/16/2011     Priority: Medium     CARDIOVASCULAR SCREENING; LDL GOAL LESS THAN 160 10/31/2010     Priority: Medium        Past Medical History:    Past Medical History:   Diagnosis Date     Other abnormal heart sounds      Ventricular septal defect 1996       Past Surgical History:    Past Surgical History:   Procedure Laterality Date     SURGICAL HISTORY OF -   1/3/05    RIGHT middle ear reconstruction, LEFT tympanostomy w/tube     SURGICAL HISTORY OF -   12/29/04    RIGHT tympanoplasty, LEFT myringotomy & PE tube        Family History:    Family History   Problem Relation Age of Onset     Gastrointestinal Disease Father         Ulcers     Heart Disease Father        Social History:  Marital Status:   [2]  Social History     Tobacco Use     Smoking status: Never Smoker     Smokeless tobacco: Never Used   Substance Use Topics     Alcohol use: Yes      "Comment: Occ. less than 7/week     Drug use: No        Medications:      amoxicillin (AMOXIL) 500 MG capsule   fluticasone (FLONASE) 50 MCG/ACT nasal spray   CALCIUM + D OR   Multiple Vitamin (MULTIVITAMIN) per tablet     Review of Systems   Constitutional: Positive for activity change, appetite change and fatigue. Negative for fever.   HENT: Positive for congestion, sinus pressure and sore throat. Negative for ear pain, facial swelling and sinus pain.    Eyes: Negative for redness and visual disturbance.   Respiratory: Positive for cough. Negative for shortness of breath, wheezing and stridor.    Cardiovascular: Negative for chest pain.   Gastrointestinal: Negative for abdominal pain.   Genitourinary: Negative for difficulty urinating and dysuria.   Musculoskeletal: Negative for arthralgias and neck stiffness.   Skin: Negative for color change, rash and wound.   Neurological: Negative for dizziness, seizures, weakness and headaches.   Psychiatric/Behavioral: Negative for confusion and self-injury.   All other systems reviewed and are negative.      Physical Exam   BP: 149/82  Heart Rate: 79  Temp: 98.1  F (36.7  C)  Height: 170.2 cm (5' 7\")  Weight: 63.5 kg (140 lb)  SpO2: 98 %      Physical Exam   Constitutional: She is oriented to person, place, and time. She appears well-developed and well-nourished.  Non-toxic appearance. She appears ill. No distress.   HENT:   Head: Normocephalic and atraumatic.   Right Ear: Hearing, tympanic membrane, external ear and ear canal normal.   Left Ear: Hearing, tympanic membrane, external ear and ear canal normal.   Nose: Mucosal edema present. No rhinorrhea. No epistaxis. Right sinus exhibits maxillary sinus tenderness. Right sinus exhibits no frontal sinus tenderness. Left sinus exhibits maxillary sinus tenderness. Left sinus exhibits no frontal sinus tenderness.   Mouth/Throat: Uvula is midline and mucous membranes are normal. No trismus in the jaw. No uvula swelling. Posterior " oropharyngeal erythema present. No oropharyngeal exudate, posterior oropharyngeal edema or tonsillar abscesses. Tonsils are 0 on the right. Tonsils are 0 on the left. No tonsillar exudate.   Eyes: Conjunctivae are normal. Right eye exhibits no discharge. Left eye exhibits no discharge. No scleral icterus.   Neck: Normal range of motion. Neck supple.   Cardiovascular: Normal rate, regular rhythm, normal heart sounds and intact distal pulses.   Pulmonary/Chest: Effort normal and breath sounds normal. No stridor. No respiratory distress. She has no wheezes. She has no rales.   Musculoskeletal: She exhibits no edema or tenderness.   Lymphadenopathy:     She has cervical adenopathy.   Neurological: She is alert and oriented to person, place, and time.   Skin: Skin is warm. No rash noted. She is not diaphoretic. No erythema.   Psychiatric: She has a normal mood and affect.   Nursing note and vitals reviewed.      ED Course        Procedures    Results for orders placed or performed during the hospital encounter of 03/11/19 (from the past 24 hour(s))   Rapid strep group A screen POCT   Result Value Ref Range    Rapid Strep A Screen Positive neg    Internal QC OK Yes        Medications - No data to display    Assessments & Plan (with Medical Decision Making)     I have reviewed the nursing notes.    I have reviewed the findings, diagnosis, plan and need for follow up with the patient.  Medical Decision Making:  CXR is not indicated.  Rapid Strep test is indicated.     Assessment:  1) Sinusitis and Strep pharyngitis.    PLAN:  Amoxicillin 1000 mg po bid to cover for sinus as well; fluticasone nasal spray also  Use ibuprofen, increase fluids and rest.   Follow up with any questions or problems         Medication List      Started    amoxicillin 500 MG capsule  Commonly known as:  AMOXIL  1,000 mg, Oral, 2 TIMES DAILY        Modified    fluticasone 50 MCG/ACT nasal spray  Commonly known as:  FLONASE  2 sprays, Both Nostrils,  DAILY  What changed:  how much to take        Discontinued    benzonatate 100 MG capsule  Commonly known as:  TESSALON            Final diagnoses:   Acute streptococcal pharyngitis   Sinusitis       3/11/2019   [facility] EMERGENCY DEPARTMENT     Wendy Rich, IZABELLA GRIMM  03/11/19 3076

## 2019-03-11 NOTE — ED AVS SNAPSHOT
[facility] Emergency Department  [address] [address]  Phone:  [phone]  Fax:  [phone]                                    Nancy K Eisenmenger   MRN: [id number]    Department:  [facility] Emergency Department   Date of Visit:  3/11/2019           After Visit Summary Signature Page    I have received my discharge instructions, and my questions have been answered. I have discussed any challenges I see with this plan with the nurse or doctor.    ..........................................................................................................................................  Patient/Patient Representative Signature      ..........................................................................................................................................  Patient Representative Print Name and Relationship to Patient    ..................................................               ................................................  Date                                   Time    ..........................................................................................................................................  Reviewed by Signature/Title    ...................................................              ..............................................  Date                                               Time          22EPIC Rev 08/18

## 2019-11-03 ENCOUNTER — HEALTH MAINTENANCE LETTER (OUTPATIENT)
Age: 69
End: 2019-11-03

## 2020-02-10 ENCOUNTER — HEALTH MAINTENANCE LETTER (OUTPATIENT)
Age: 70
End: 2020-02-10

## 2020-11-16 ENCOUNTER — HEALTH MAINTENANCE LETTER (OUTPATIENT)
Age: 70
End: 2020-11-16

## 2021-04-03 ENCOUNTER — HEALTH MAINTENANCE LETTER (OUTPATIENT)
Age: 71
End: 2021-04-03

## 2021-05-25 ENCOUNTER — RECORDS - HEALTHEAST (OUTPATIENT)
Dept: ADMINISTRATIVE | Facility: CLINIC | Age: 71
End: 2021-05-25

## 2021-05-26 ENCOUNTER — RECORDS - HEALTHEAST (OUTPATIENT)
Dept: ADMINISTRATIVE | Facility: CLINIC | Age: 71
End: 2021-05-26

## 2021-09-12 ENCOUNTER — HEALTH MAINTENANCE LETTER (OUTPATIENT)
Age: 71
End: 2021-09-12

## 2021-11-07 ENCOUNTER — HEALTH MAINTENANCE LETTER (OUTPATIENT)
Age: 71
End: 2021-11-07

## 2022-04-24 ENCOUNTER — HEALTH MAINTENANCE LETTER (OUTPATIENT)
Age: 72
End: 2022-04-24

## 2022-11-19 ENCOUNTER — HEALTH MAINTENANCE LETTER (OUTPATIENT)
Age: 72
End: 2022-11-19

## 2023-06-01 ENCOUNTER — HEALTH MAINTENANCE LETTER (OUTPATIENT)
Age: 73
End: 2023-06-01

## 2023-11-19 ENCOUNTER — HEALTH MAINTENANCE LETTER (OUTPATIENT)
Age: 73
End: 2023-11-19